# Patient Record
Sex: MALE | URBAN - METROPOLITAN AREA
[De-identification: names, ages, dates, MRNs, and addresses within clinical notes are randomized per-mention and may not be internally consistent; named-entity substitution may affect disease eponyms.]

---

## 2023-02-21 ENCOUNTER — APPOINTMENT (RX ONLY)
Dept: URBAN - METROPOLITAN AREA CLINIC 162 | Facility: CLINIC | Age: 78
Setting detail: DERMATOLOGY
End: 2023-02-21

## 2023-02-21 DIAGNOSIS — L92.0 GRANULOMA ANNULARE: ICD-10-CM | Status: INADEQUATELY CONTROLLED

## 2023-02-21 DIAGNOSIS — I87.2 VENOUS INSUFFICIENCY (CHRONIC) (PERIPHERAL): ICD-10-CM | Status: INADEQUATELY CONTROLLED

## 2023-02-21 PROBLEM — L30.9 DERMATITIS, UNSPECIFIED: Status: ACTIVE | Noted: 2023-02-21

## 2023-02-21 PROCEDURE — ? PRESCRIPTION

## 2023-02-21 PROCEDURE — ? BIOPSY BY PUNCH METHOD

## 2023-02-21 PROCEDURE — ? PHOTO-DOCUMENTATION

## 2023-02-21 PROCEDURE — 11104 PUNCH BX SKIN SINGLE LESION: CPT

## 2023-02-21 PROCEDURE — ? COUNSELING

## 2023-02-21 PROCEDURE — 99204 OFFICE O/P NEW MOD 45 MIN: CPT | Mod: 25

## 2023-02-21 PROCEDURE — ? ADDITIONAL NOTES

## 2023-02-21 PROCEDURE — ? PRESCRIPTION MEDICATION MANAGEMENT

## 2023-02-21 RX ORDER — CLOBETASOL PROPIONATE 0.5 MG/G
CREAM TOPICAL BID
Qty: 60 | Refills: 3 | Status: ERX | COMMUNITY
Start: 2023-02-21

## 2023-02-21 RX ORDER — GENTAMICIN SULFATE 1 MG/G
OINTMENT TOPICAL
Qty: 30 | Refills: 0 | Status: ERX | COMMUNITY
Start: 2023-02-21

## 2023-02-21 RX ADMIN — CLOBETASOL PROPIONATE: 0.5 CREAM TOPICAL at 00:00

## 2023-02-21 RX ADMIN — GENTAMICIN SULFATE: 1 OINTMENT TOPICAL at 00:00

## 2023-02-21 ASSESSMENT — LOCATION DETAILED DESCRIPTION DERM
LOCATION DETAILED: LEFT ANTERIOR PROXIMAL THIGH
LOCATION DETAILED: RIGHT DISTAL PRETIBIAL REGION
LOCATION DETAILED: LEFT DISTAL PRETIBIAL REGION

## 2023-02-21 ASSESSMENT — LOCATION ZONE DERM: LOCATION ZONE: LEG

## 2023-02-21 ASSESSMENT — LOCATION SIMPLE DESCRIPTION DERM
LOCATION SIMPLE: LEFT THIGH
LOCATION SIMPLE: LEFT PRETIBIAL REGION
LOCATION SIMPLE: RIGHT PRETIBIAL REGION

## 2023-02-21 NOTE — PROCEDURE: BIOPSY BY PUNCH METHOD
Detail Level: Detailed
Was A Bandage Applied: Yes
Punch Size In Mm: 4
Size Of Lesion In Cm (Optional): 0
Depth Of Punch Biopsy: dermis
Biopsy Type: H and E
Anesthesia Type: 1% lidocaine with epinephrine
Anesthesia Volume In Cc (Will Not Render If 0): 0.7
Hemostasis: None
Epidermal Sutures: 3-0 Ethilon
Wound Care: Petrolatum
Dressing: bandage
Suture Removal: 14 days
Patient Will Remove Sutures At Home?: No
Lab: -[id number]
Lab Facility: [id number]
Consent: Written consent was obtained and risks were reviewed including but not limited to scarring, infection, bleeding, scabbing, incomplete removal, nerve damage and allergy to anesthesia.
Post-Care Instructions: I reviewed with the patient in detail post-care instructions. Patient is to keep the biopsy site dry overnight, and then apply bacitracin twice daily until healed. Patient may apply hydrogen peroxide soaks to remove any crusting.
Home Suture Removal Text: Patient was provided a home suture removal kit and will remove their sutures at home.  If they have any questions or difficulties they will call the office.
Notification Instructions: Patient will be notified of biopsy results. However, patient instructed to call the office if not contacted within 2 weeks.
Billing Type: Third-Party Bill
Information: Selecting Yes will display possible errors in your note based on the variables you have selected. This validation is only offered as a suggestion for you. PLEASE NOTE THAT THE VALIDATION TEXT WILL BE REMOVED WHEN YOU FINALIZE YOUR NOTE. IF YOU WANT TO FAX A PRELIMINARY NOTE YOU WILL NEED TO TOGGLE THIS TO 'NO' IF YOU DO NOT WANT IT IN YOUR FAXED NOTE.

## 2023-03-07 ENCOUNTER — APPOINTMENT (RX ONLY)
Dept: URBAN - METROPOLITAN AREA CLINIC 162 | Facility: CLINIC | Age: 78
Setting detail: DERMATOLOGY
End: 2023-03-07

## 2023-03-07 DIAGNOSIS — L92.0 GRANULOMA ANNULARE: ICD-10-CM | Status: INADEQUATELY CONTROLLED

## 2023-03-07 DIAGNOSIS — I87.2 VENOUS INSUFFICIENCY (CHRONIC) (PERIPHERAL): ICD-10-CM | Status: IMPROVED

## 2023-03-07 DIAGNOSIS — Z48.02 ENCOUNTER FOR REMOVAL OF SUTURES: ICD-10-CM

## 2023-03-07 PROCEDURE — ? SUTURE REMOVAL (GLOBAL PERIOD)

## 2023-03-07 PROCEDURE — ? ADDITIONAL NOTES

## 2023-03-07 PROCEDURE — ? COUNSELING

## 2023-03-07 PROCEDURE — ? MEDICATION COUNSELING

## 2023-03-07 PROCEDURE — ? PRESCRIPTION

## 2023-03-07 PROCEDURE — 99214 OFFICE O/P EST MOD 30 MIN: CPT

## 2023-03-07 PROCEDURE — ? PRESCRIPTION MEDICATION MANAGEMENT

## 2023-03-07 PROCEDURE — ? PHOTO-DOCUMENTATION

## 2023-03-07 RX ORDER — GENTAMICIN SULFATE 1 MG/G
OINTMENT TOPICAL
Qty: 30 | Refills: 0 | Status: ERX

## 2023-03-07 ASSESSMENT — LOCATION DETAILED DESCRIPTION DERM
LOCATION DETAILED: LEFT ANTERIOR PROXIMAL THIGH
LOCATION DETAILED: LEFT DISTAL PRETIBIAL REGION
LOCATION DETAILED: RIGHT DISTAL PRETIBIAL REGION

## 2023-03-07 ASSESSMENT — BSA RASH: BSA RASH: 13

## 2023-03-07 ASSESSMENT — LOCATION SIMPLE DESCRIPTION DERM
LOCATION SIMPLE: LEFT PRETIBIAL REGION
LOCATION SIMPLE: RIGHT PRETIBIAL REGION
LOCATION SIMPLE: LEFT THIGH

## 2023-03-07 ASSESSMENT — LOCATION ZONE DERM: LOCATION ZONE: LEG

## 2023-03-07 NOTE — PROCEDURE: SUTURE REMOVAL (GLOBAL PERIOD)
Detail Level: Detailed
Add 38508 Cpt? (Important Note: In 2017 The Use Of 14277 Is Being Tracked By Cms To Determine Future Global Period Reimbursement For Global Periods): no

## 2023-03-07 NOTE — PROCEDURE: ADDITIONAL NOTES
Render Risk Assessment In Note?: yes
Detail Level: Simple
Additional Notes: Patient has a history of diabetes,\\nPatient is to follow up its physicians in [place] .\\n\\nCopy of path given to pt to keep
Additional Notes: He was also advised to go to the hospital if he has any shortness of breath.

## 2023-03-07 NOTE — PROCEDURE: PRESCRIPTION MEDICATION MANAGEMENT
Continue Regimen: Clobetasol cream bid 2 weeks on , 2 weeks off.
Detail Level: Zone
Render In Strict Bullet Format?: No
Initiate Treatment: Gentamicin ointment

## 2023-05-18 ENCOUNTER — OFFICE (OUTPATIENT)
Dept: URBAN - METROPOLITAN AREA CLINIC 12 | Facility: CLINIC | Age: 78
Setting detail: OPHTHALMOLOGY
End: 2023-05-18
Payer: MEDICARE

## 2023-05-18 DIAGNOSIS — H18.513: ICD-10-CM

## 2023-05-18 DIAGNOSIS — H43.393: ICD-10-CM

## 2023-05-18 DIAGNOSIS — H35.373: ICD-10-CM

## 2023-05-18 DIAGNOSIS — H04.123: ICD-10-CM

## 2023-05-18 DIAGNOSIS — H40.013: ICD-10-CM

## 2023-05-18 DIAGNOSIS — H43.813: ICD-10-CM

## 2023-05-18 DIAGNOSIS — E11.3293: ICD-10-CM

## 2023-05-18 DIAGNOSIS — H35.3131: ICD-10-CM

## 2023-05-18 PROCEDURE — 92014 COMPRE OPH EXAM EST PT 1/>: CPT | Performed by: OPHTHALMOLOGY

## 2023-05-18 PROCEDURE — 92134 CPTRZ OPH DX IMG PST SGM RTA: CPT | Performed by: OPHTHALMOLOGY

## 2023-05-18 ASSESSMENT — REFRACTION_CURRENTRX
OS_VPRISM_DIRECTION: SV
OD_AXIS: 0
OD_OVR_VA: 20/
OS_SPHERE: +2.25
OD_VPRISM_DIRECTION: SV
OS_OVR_VA: 20/
OS_CYLINDER: -0.50
OD_CYLINDER: 0.00
OS_OVR_VA: 20/
OD_SPHERE: +1.75
OD_VPRISM_DIRECTION: SV
OS_AXIS: 76
OS_CYLINDER: -0.50
OD_SPHERE: +2.25
OS_SPHERE: +1.75
OS_AXIS: 81
OD_CYLINDER: -0.50
OS_VPRISM_DIRECTION: SV
OD_OVR_VA: 20/
OD_AXIS: 98

## 2023-05-18 ASSESSMENT — REFRACTION_MANIFEST
OS_ADD: +2.75
OD_VA1: 20/40
OD_ADD: +3.00
OS_AXIS: 050
OD_AXIS: 135
OS_SPHERE: -0.50
OD_AXIS: 120
OS_VA1: 20/40
OS_CYLINDER: -0.25
OD_CYLINDER: -0.50
OD_SPHERE: +0.50
OS_SPHERE: -0.50
OS_ADD: +3.00
OD_VA1: 20/30
OD_ADD: +2.75
OD_CYLINDER: -0.25
OS_CYLINDER: -0.25
OD_SPHERE: +0.25
OS_VA1: 20/30
OS_AXIS: 050

## 2023-05-18 ASSESSMENT — PACHYMETRY
OS_CT_CORRECTION: -6
OD_CT_CORRECTION: -6
OS_CT_UM: 620
OD_CT_UM: 636

## 2023-05-18 ASSESSMENT — CORNEAL DYSTROPHY - POSTERIOR
OD_POSTERIORDYSTROPHY: GUTTATA
OS_POSTERIORDYSTROPHY: GUTTATA

## 2023-05-18 ASSESSMENT — REFRACTION_AUTOREFRACTION
OD_AXIS: 115
OS_AXIS: 050
OS_CYLINDER: -0.50
OS_SPHERE: -0.50
OD_SPHERE: +0.75
OD_CYLINDER: -0.75

## 2023-05-18 ASSESSMENT — LID EXAM ASSESSMENTS
OS_BLEPHARITIS: LUL 1+
OD_BLEPHARITIS: RUL 1+

## 2023-05-18 ASSESSMENT — SPHEQUIV_DERIVED
OD_SPHEQUIV: 0.125
OD_SPHEQUIV: 0.375
OD_SPHEQUIV: 0.25
OS_SPHEQUIV: -0.625
OS_SPHEQUIV: -0.625
OS_SPHEQUIV: -0.75

## 2023-05-18 ASSESSMENT — KERATOMETRY
OD_K1POWER_DIOPTERS: 42.50
OS_K2POWER_DIOPTERS: 42.75
OD_K2POWER_DIOPTERS: 42.50
METHOD_AUTO_MANUAL: AUTO
OD_AXISANGLE_DEGREES: 090
OS_K1POWER_DIOPTERS: 42.25
OS_AXISANGLE_DEGREES: 109

## 2023-05-18 ASSESSMENT — AXIALLENGTH_DERIVED
OD_AL: 23.9149
OD_AL: 23.8152
OS_AL: 24.2704
OD_AL: 23.8649
OS_AL: 24.219
OS_AL: 24.219

## 2023-05-18 ASSESSMENT — SUPERFICIAL PUNCTATE KERATITIS (SPK)
OS_SPK: 1+
OD_SPK: 1+

## 2023-05-18 ASSESSMENT — VISUAL ACUITY
OS_BCVA: 20/40-1
OD_BCVA: 20/40

## 2023-05-18 ASSESSMENT — CONFRONTATIONAL VISUAL FIELD TEST (CVF)
OD_FINDINGS: FULL
OS_FINDINGS: FULL

## 2023-05-18 ASSESSMENT — TONOMETRY
OS_IOP_MMHG: 18
OD_IOP_MMHG: 17

## 2023-11-16 NOTE — PROCEDURE: MEDICATION COUNSELING
Hypokalemia Protopic Counseling: Patient may experience a mild burning sensation during topical application. Protopic is not approved in children less than 2 years of age. There have been case reports of hematologic and skin malignancies in patients using topical calcineurin inhibitors although causality is questionable.

## 2024-03-05 PROBLEM — H50.34 EXOTROPIA, INTERMITTENT ALTERNATING: Status: ACTIVE | Noted: 2024-03-05

## 2024-12-11 NOTE — PROCEDURE: PHOTO-DOCUMENTATION
[facility] Refill Guideline reviewed.  Medication meets criteria for refill.   
Photo Preface (Leave Blank If You Do Not Want): Photographs were obtained today
Detail Level: Detailed